# Patient Record
Sex: FEMALE | Race: WHITE | NOT HISPANIC OR LATINO | Employment: FULL TIME | URBAN - METROPOLITAN AREA
[De-identification: names, ages, dates, MRNs, and addresses within clinical notes are randomized per-mention and may not be internally consistent; named-entity substitution may affect disease eponyms.]

---

## 2023-01-09 ENCOUNTER — OFFICE VISIT (OUTPATIENT)
Dept: ORTHOPEDIC SURGERY | Facility: CLINIC | Age: 63
End: 2023-01-09
Payer: COMMERCIAL

## 2023-01-09 VITALS
SYSTOLIC BLOOD PRESSURE: 140 MMHG | DIASTOLIC BLOOD PRESSURE: 80 MMHG | HEIGHT: 71 IN | WEIGHT: 228 LBS | BODY MASS INDEX: 31.92 KG/M2

## 2023-01-09 DIAGNOSIS — M70.62 TROCHANTERIC BURSITIS OF BOTH HIPS: ICD-10-CM

## 2023-01-09 DIAGNOSIS — M25.552 HIP PAIN, BILATERAL: Primary | ICD-10-CM

## 2023-01-09 DIAGNOSIS — M70.61 TROCHANTERIC BURSITIS OF BOTH HIPS: ICD-10-CM

## 2023-01-09 DIAGNOSIS — M25.551 HIP PAIN, BILATERAL: Primary | ICD-10-CM

## 2023-01-09 PROCEDURE — 99203 OFFICE O/P NEW LOW 30 MIN: CPT | Performed by: PHYSICIAN ASSISTANT

## 2023-01-09 RX ORDER — LOSARTAN POTASSIUM AND HYDROCHLOROTHIAZIDE 12.5; 5 MG/1; MG/1
TABLET ORAL
COMMUNITY
Start: 2022-12-17

## 2023-01-09 RX ORDER — METOPROLOL SUCCINATE 50 MG/1
50 TABLET, EXTENDED RELEASE ORAL DAILY
COMMUNITY
Start: 2022-11-15

## 2023-01-09 RX ORDER — APIXABAN 5 MG/1
TABLET, FILM COATED ORAL
COMMUNITY
Start: 2022-12-17

## 2023-01-09 NOTE — PROGRESS NOTES
[facility] [place] [facility] Note    Subjective     Chief Complaint   Patient presents with   • Left Hip - Pain   • Right Hip - Pain        HPI  Haily Mario is a 62 y.o. female.  New patient presents for evaluation of bilateral lateral hip pain.  Symptoms/pain have been ongoing for about a year.  AUGUSTIN: No history of injury or trauma.  Only notes pain when rising from a seated position.  Patient presents today because she was concerned that she had significant arthritis in her hips and would require THAs.    Pain scale: 3/10.  Severity of the pain mild to moderate.  Quality of the pain aching.  Associated symptoms occasional popping.  Activity related to pain rising from a seated position.  Pain eased by moving, walking.  No reported numbness or tingling.      Patient has seen Dr. Osorio at  about 5 years ago and was provided Voltaren gel and PT for similar issue.  PT did help but she did not continue on with the exercises as she should have.    Unable to take oral NSAIDs secondary to Eliquis use.  Currently taking Tylenol as needed.    Denies fever, chills, night sweats or other constitutional symptoms.  Does have history of lumbar spondylosis and disc protrusion noted L5-S1.      Past Medical History:   Diagnosis Date   • Bursitis of hip 2016?    Trochanteric bursitis   • Knee swelling 2011    Right Torn meniscus/torn ACL - ACL NOT repaired   • Lumbosacral disc disease 2015    Disc bulge - L2-L3, L3-L4, L4-L5   • Rotator cuff syndrome July 2014    Right shoulder - NOT repaired   • Tear of meniscus of knee 2011    Meniscectomy right knee - Dr. Gomes repaired      Past Surgical History:   Procedure Laterality Date   • KNEE SURGERY  2011    Meniscectomy      Family History   Problem Relation Age of Onset   • Heart attack Mother    • Stroke Mother    • Stroke Father      Social History     Socioeconomic History   • Marital status:    Tobacco Use   • Smoking status: Never   Substance and Sexual  "Activity   • Alcohol use: Yes     Alcohol/week: 2.0 standard drinks     Types: 2 Glasses of wine per week   • Drug use: Never   • Sexual activity: Yes     Partners: Male     Birth control/protection: Post-menopausal, Bilateral salpingectomy       Current Outpatient Medications on File Prior to Visit   Medication Sig Dispense Refill   • Cholecalciferol (VITAMIN D3 PO) Vitamin D3     • Cyanocobalamin (VITAMIN B-12 SL) Place  under the tongue.     • Eliquis 5 MG tablet tablet      • losartan-hydrochlorothiazide (HYZAAR) 50-12.5 MG per tablet      • metoprolol succinate XL (TOPROL-XL) 50 MG 24 hr tablet Take 50 mg by mouth Daily.       No current facility-administered medications on file prior to visit.      Allergies   Allergen Reactions   • Erythromycin Hives        The following portions of the patient's history were reviewed and updated as appropriate: allergies, current medications, past family history, past medical history, past social history, past surgical history and problem list.    Review of Systems   Constitutional: Negative.    HENT: Negative.    Eyes: Negative.    Respiratory: Negative.    Cardiovascular: Negative.    Gastrointestinal: Negative.    Endocrine: Negative.    Genitourinary: Negative.    Musculoskeletal: Positive for arthralgias.   Skin: Negative.    Allergic/Immunologic: Negative.    Neurological: Negative.    Hematological: Negative.    Psychiatric/Behavioral: Negative.         Objective      Physical Exam  /80   Ht 180.3 cm (71\")   Wt 103 kg (228 lb)   BMI 31.80 kg/m²     Body mass index is 31.8 kg/m².    GENERAL APPEARANCE: awake, alert & oriented x 3, in no acute distress and well developed, well nourished  PSYCH: normal mood and affect  LUNGS:  breathing nonlabored, no wheezing  EYES: sclera anicteric, pupils equal  CARDIOVASCULAR: palpable pulses. Capillary refill less than 2 seconds  INTEGUMENTARY: skin intact, no clubbing, cyanosis  NEUROLOGIC:  Normal Sensation         Ortho " Exam  Bilateral hip  Tenderness: Anterior hip/flexors negative.  Groin negative.  Lateral hip/GT positive. Posterior hip/SIJ negative.  Motion: Flx 110°, internal rotation 30°, external rotation 30°, abduction 45°.   Testing: Cooper positive, Stinchfield positive for pulling sensation lateral aspect of both hips, Hip flxe to 90° with IR/ER negative.  Strength: Hip flx/ext/abd 5/5, Q/HS 5/5.  Motor: Grossly intact Q/HS/TA/GS/EHL/P.  Sensory: Grossly intact to LT L2-S1.      Imaging/Studies  Ordered bilateral hips plain films.  Imaging read/interpreted by Dr. Montoya.    Imaging Results (Last 7 Days)     Procedure Component Value Units Date/Time    XR Hips Bilateral With or Without Pelvis 3-4 View [[id number]] Resulted: 01/09/23 0924     Updated: 01/09/23 0926    Narrative:      Right Hip Radiographs  Indication: right hip pain  Views: low AP pelvis and lateral of the right hip    Comparison: no prior studies available for review    Findings:   Calcification of the labrum, no acute bony abnormalities.  No unusual bony   features.    Left Hip Radiographs  Indication: left hip pain  Views: low AP pelvis and lateral of the left hip    Comparison: no prior studies available for review    Findings:   No acute bony abnormalities.  No unusual bony features.            Assessment/Plan        ICD-10-CM ICD-9-CM   1. Hip pain, bilateral  M25.551 719.45    M25.552    2. Trochanteric bursitis of both hips  M70.61 726.5    M70.62        Orders Placed This Encounter   Procedures   • XR Hips Bilateral With or Without Pelvis 3-4 View        -Bilateral lateral hip pain due to trochanteric bursitis.  -Patient does have some mild arthritic changes to both hips--full range of motion and no pain on exam.  -Reviewed imaging with the patient.  -Offered to refer her back to physical therapy but she states she understands what exercises she needs to return to doing--formal PT referral deferred for now.  If she changes her mind she will contact  clinic.  -Patient unable to take NSAIDs due to her Eliquis use.  Will continue taking Tylenol.  -Offered a prescription for Voltaren gel but this is over-the-counter now so she will purchase on her own.  -Follow up our clinic as needed.  -Questions and concerns answered.      Medical Decision Making  Management Options : over-the-counter medicine  Data/Risk: radiology tests       Savita Ramirez PA-C  01/09/23  09:31 EST               EMR Dragon/Transcription disclaimer:  Much of this encounter note is an electronic transcription of spoken language to printed text. Electronic transcription of spoken language may permit erroneous, or at times, nonsensical words or phrases to be inadvertently transcribed. Although I have reviewed the note for such errors, some may still exist.